# Patient Record
Sex: FEMALE | Race: WHITE | Employment: FULL TIME | URBAN - METROPOLITAN AREA
[De-identification: names, ages, dates, MRNs, and addresses within clinical notes are randomized per-mention and may not be internally consistent; named-entity substitution may affect disease eponyms.]

---

## 2020-02-03 ENCOUNTER — MEDICAL CORRESPONDENCE (OUTPATIENT)
Dept: HEALTH INFORMATION MANAGEMENT | Facility: CLINIC | Age: 65
End: 2020-02-03

## 2020-02-03 ENCOUNTER — TRANSFERRED RECORDS (OUTPATIENT)
Dept: HEALTH INFORMATION MANAGEMENT | Facility: CLINIC | Age: 65
End: 2020-02-03

## 2020-02-04 NOTE — TELEPHONE ENCOUNTER
FUTURE VISIT INFORMATION      FUTURE VISIT INFORMATION:    Date: 4/27/20    Time: 12:15pm    Location: [facility]  REFERRAL INFORMATION:    Referring provider:   Dr. Meyers     Referring providers clinic:  [facility]    Reason for visit/diagnosis  chronic canacolitis     RECORDS REQUESTED FROM:       Clinic name Comments Records Status Imaging Status   [facility] Request for recs sent 2/4- recs received and sent to scanning 2/5 [facility] OV/note 11/10/14-2/28/14 EPIC

## 2020-04-08 ENCOUNTER — DOCUMENTATION ONLY (OUTPATIENT)
Dept: CARE COORDINATION | Facility: CLINIC | Age: 65
End: 2020-04-08

## 2020-04-27 ENCOUNTER — PRE VISIT (OUTPATIENT)
Dept: OPHTHALMOLOGY | Facility: CLINIC | Age: 65
End: 2020-04-27

## 2021-06-04 ENCOUNTER — TRANSFERRED RECORDS (OUTPATIENT)
Dept: HEALTH INFORMATION MANAGEMENT | Facility: CLINIC | Age: 66
End: 2021-06-04

## 2021-06-04 ENCOUNTER — MEDICAL CORRESPONDENCE (OUTPATIENT)
Dept: HEALTH INFORMATION MANAGEMENT | Facility: CLINIC | Age: 66
End: 2021-06-04

## 2021-07-12 ENCOUNTER — OFFICE VISIT (OUTPATIENT)
Dept: OPHTHALMOLOGY | Facility: CLINIC | Age: 66
End: 2021-07-12
Payer: COMMERCIAL

## 2021-07-12 ENCOUNTER — PRE VISIT (OUTPATIENT)
Dept: OPHTHALMOLOGY | Facility: CLINIC | Age: 66
End: 2021-07-12

## 2021-07-12 ENCOUNTER — TELEPHONE (OUTPATIENT)
Dept: OPHTHALMOLOGY | Facility: CLINIC | Age: 66
End: 2021-07-12

## 2021-07-12 VITALS — BODY MASS INDEX: 19.63 KG/M2 | HEIGHT: 64 IN | WEIGHT: 115 LBS

## 2021-07-12 DIAGNOSIS — H04.421 CHRONIC LACRIMAL CANALICULITIS OF RIGHT LACRIMAL PASSAGE: Primary | ICD-10-CM

## 2021-07-12 PROCEDURE — 99204 OFFICE O/P NEW MOD 45 MIN: CPT | Mod: GC | Performed by: OPHTHALMOLOGY

## 2021-07-12 RX ORDER — MAGNESIUM 200 MG
200 TABLET ORAL
COMMUNITY

## 2021-07-12 RX ORDER — LISINOPRIL 20 MG/1
5 TABLET ORAL DAILY
COMMUNITY

## 2021-07-12 RX ORDER — OFLOXACIN 3 MG/ML
SOLUTION/ DROPS OPHTHALMIC
COMMUNITY
Start: 2021-06-04

## 2021-07-12 RX ORDER — DIPHENHYDRAMINE HCL 25 MG
CAPSULE ORAL
COMMUNITY

## 2021-07-12 ASSESSMENT — CONF VISUAL FIELD
OD_NORMAL: 1
OS_NORMAL: 1
METHOD: COUNTING FINGERS

## 2021-07-12 ASSESSMENT — VISUAL ACUITY
METHOD: SNELLEN - LINEAR
OD_CC: 20/25
CORRECTION_TYPE: GLASSES
OS_CC+: -1
OD_CC+: -2
OS_CC: 20/20
OD_PH_CC: 20/20
OD_PH_CC+: -3

## 2021-07-12 ASSESSMENT — MIFFLIN-ST. JEOR: SCORE: 1061.64

## 2021-07-12 ASSESSMENT — TONOMETRY
OD_IOP_MMHG: 12
IOP_METHOD: ICARE
OS_IOP_MMHG: 12

## 2021-07-12 ASSESSMENT — EXTERNAL EXAM - RIGHT EYE: OD_EXAM: NORMAL

## 2021-07-12 ASSESSMENT — EXTERNAL EXAM - LEFT EYE: OS_EXAM: NORMAL

## 2021-07-12 NOTE — TELEPHONE ENCOUNTER
Met with patient to schedule surgery with Dr. Rishi Butts.    Surgery was scheduled on 09/01 at [facility]  Patient will have H&P at [facility] Shanae Bautista (PCP)    Patient is aware a COVID-19 test is needed before their procedure. The test should be with-in 4 days of their procedure.   Test Details: Patient will schedule at local clinic: [facility]     Post-Op visit was scheduled on 09/13 Via Phone  Patient is aware a / is needed day of surgery.   Surgery packet was given, patient has my direct contact information for any further questions.

## 2021-07-12 NOTE — LETTER
2021         RE:  :  MRN: Camille Pitts  1955  [id number]     Dear Dr. Ger Doran,    Thank you for asking me to see your patient, Camille Pitts, for an oculoplastic   consultation.  My assessment and plan are below.  For further details, please see my attached clinic note.      Assessment & Plan     Camille Pitts is a 65 year old female with the following diagnoses:   1. 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      Sincerely,    Rishi Butts MD  Department of Ophthalmology and Visual Neurosciences  [facility]    CC: Shanae Bautista  [address] [address]  Via Fax: [phone]     Ger Doran, OD  [facility]  [address] [facility] [address]  Via Fax: [phone]

## 2021-07-12 NOTE — NURSING NOTE
Chief Complaints and History of Present Illnesses   Patient presents with     Consult For     Possible scope and irrigation      Chief Complaint(s) and History of Present Illness(es)     Consult For     Laterality: right eye    Associated symptoms: eye pain, itching, redness and tearing    Pain scale: 5/10    Comments: Possible scope and irrigation               Comments     Here by recommendation of Dr Ger Doran due to discharge right eye/ recurrent canaicluits. Experiencing inceasing dicharge form punta OD. Patient reports this has been ongoing issues for year. Did have medication in the past for Diviculitts that resolved this problem for about 3 years. Then this began again a year ago.   2 rounds of antibiotics last year helped temporarily. This year not responding to antibiotics. Hot packing in the morning with right eye. Matters and mcgowan during the day. Will get pain and pressure when matter gets really  Impacted with right eye and has to massage as much out as possible and use drops to treat.     States 2014 same issues with left eye that Dr Butts corrected.     No issues with vision related to this.         Just finished anitbiotics 2 week ago and did not help much.     Darling Mcmahan, COT COT 1:20 PM July 12, 2021

## 2021-07-12 NOTE — PROGRESS NOTES
Chief Complaints and History of Present Illnesses   Patient presents with     Consult For     Possible scope and irrigation      Chief Complaint(s) and History of Present Illness(es)     Consult For     In right eye.  Associated symptoms include eye pain, itching, redness and   tearing.  Pain was noted as 5/10. Additional comments: Possible scope and   irrigation               Comments     Here by recommendation of Dr Ger Doran due to discharge right eye/   recurrent canaicluits. Experiencing inceasing dicharge form punta OD.   Patient reports this has been ongoing issues for year. Did have medication   in the past for Diverticuliitis that resolved this problem for about 3 years.   Then this began again a year ago.   2 rounds of antibiotics last year helped temporarily. This year not   responding to antibiotics. Hot packing in the morning with right eye.   Matters and mcgowan during the day. Will get pain and pressure when matter   gets really  Impacted with right eye and has to massage as much out as   possible and use drops to treat.     States 2014 same issues with left eye that Dr Butts corrected.     No issues with vision related to this.         Just finished anitbiotics 2 week ago and did not help much.     Darling Mcmahan, COT COT 1:20 PM July 12, 2021             Pt here for concern of canaliculitis od. Has history of canaliculitis s/p left lower eyelid punctoplasty/canaliculotomy in 2014. Has not had any issues with the left eye since the surgery. Since [id number] she has noticed purulent drainage from the right eye and has been treated several times with PO antibiotics, but it has never fully cleared the infection. Had an episode of diverticuliitis which was treated with stronger abx ~ 5 years ago, which treated the eye drainage well and she didn't have recurrence for about 3 years. Now has most recently had purulent yellow drainage from the right eye for the past 3 weeks. Completed her recent course  of antibiotics 1.5-2 weeks ago.           Assessment & Plan     Camille Pitts is a 65 year old female with the following diagnoses:   1. 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  I personally obtained the chief complaint(s) and history of present illness.  I confirmed and edited as necessary the review of systems, past medical/surgical history, family history, social history, and examination findings as documented by others; and I examined the patient myself.  I personally reviewed the relevant tests, images, and reports as documented above.  I formulated and edited as necessary the assessment and plan and discussed the findings and management plan with the patient and family.   -Rishi Butts MD    Today with Camille Pitts, I reviewed the indications, risks, benefits, and alternatives of the proposed surgical procedure including, but not limited to, failure obtain the desired result  and need for additional surgery, bleeding, infection, loss of vision, loss of the eye, and the remote possibility of permanent damage to any organ system or death with the use of anesthesia.  I provided multiple opportunities for the questions, answered all questions to the best of my ability, and confirmed that my answers and my discussion were understood.     - Rishi Butts MD 2:17 PM 7/12/2021

## 2021-07-12 NOTE — PATIENT INSTRUCTIONS
TEAR SYSTEM  The tear film on the surface of the eye is a critical component of maintaining vision. Tears nourish and lubricate the surface of the eye as well as wash away debris. A smooth, balanced tear film (consisting of water, oil and mucus) also allows light to enter the eye in an optimal fashion. If there is a disturbance of the tear film, patients will often experience tearing, burning, irritation and most importantly blurred vision. Patients who experience tearing either have a problem with tear production or tear drainage.    Increased Tear Production and Dry Eyes  The eye has two sets of structures that produce tears. Smaller tear glands help maintain a baseline level of moisture on the surface of the eye. Unfortunately, inflammatory conditions like rheumatoid arthritis, Sjogrens disease as well as aging and menopause lead to decreased tear production. As tear production diminishes, the surface of the eye starts to dry out. Further, inflammation of the oil glands along the edge of the eyelid, common in patients with roseacea, also causes early breakdown and evaporation of the tear film. The brain senses the eye is both dry and irritated and in turn signals the main tear gland to flush the eye. As a result, the dry eye paradoxically tears and becomes watery. Patients with dry eyes note intermittent tearing of the eyes during activities like reading, driving, watching TV, using a computer or going outside on a windy day. These all cause the eye to dry out because the eye blinks less during these activities. The treatment for dry eyes includes 1) replacing tears with artificial lubricants which can be bought over the counter, 2) medications like Restasis that decrease inflammation in tear glands and encourages natural tear production to resume and finally 3) plugging of the tear drain. Other causes of increased tear production exist like allergies, infections and eyelashes poking the eye. These conditions can  often be found during examination.       What Are the Causes of Obstructed Tear Ducts?  An obstruction of the tear ducts may occur due to numerous reasons (aging, trauma, inflammatory conditions, medications and tumors) and cause numerous signs and symptoms ranging from wateriness or tearing to discharge, swelling, pain and infection. These signs and symptoms may result from the tear drainage system becoming obstructed at any point from the puncta to the nasal cavity.       What Are the Symptoms of Obstructed Tear Ducts?  If the tear passageways become blocked, tears cannot drain properly and may overflow from the eyelids onto the face as if you were crying. In addition to excessive tearing you may also experience blurred vision, mucous discharge, eye irritation, and painful swelling in the inner corner of the eyelids. A thorough examination by an ophthalmic plastic surgeon can determine the cause of tearing and recommended treatment.     How is an Obstructed Tear Duct Treated or Repaired?   Depending on your symptoms and their severity, your specialist will suggest an appropriate course. In mild cases, a treatment of warm compresses and antibiotics may be recommended. In more severe cases, surgical intervention to bypass the tear duct obstruction (dacryocystorhinostomy or DCR surgery) may be recommended. A DCR is performed by creating a new tear passageway from the lacrimal sac to the nose, bypassing the obstruction. This procedure may be perfomed through a small incision between the eye and nose (external DCR) or through the nose using a special lighted telescope (endoscopic DCR). A small silicone tube called a stent may temporarily be placed in the new passageway to keep it open during the healing process. In a small percentage of cases, the obstruction is between the puncta and the lacrimal sac. In these cases, in addition to the DCR procedure, the surgeon will insert a tiny artificial tear drain called a Cleary  Tube. A Cleary Tube is made of Pyrex glass and allows tears to drain directly from the eye to the lacrimal sac.       Where Is The Surgery Performed?  DCR surgery is usually performed as an outpatient procedure. Patients usually have some bruising and swelling on the side of the nose that subsides in one to two weeks. In general, surgery has a greater than 90% success rate and most patients experience a resolution of their tearing and discharge problems once surgery and recovery are completed.       Who Should Perform DCR Surgery?  When choosing a surgeon to perform a dacryocystorhinostomy or DCR, look for an ophthalmic plastic reconstructive and cosmetic surgeon who specializes in the eyelids, orbit, and tear drain system. It s also important that he or she is a member of the American Society of Ophthalmic Plastic and Reconstructive Surgery (ASOPRS). Dr. Penny's membership in the American Society of Ophthalmic Plastic and Reconstructive Surgery (ASOPRS) indicates he is not only a board certified ophthalmologist who knows the anatomy and structure of the eyelids and orbit, but also has had extensive training in ophthalmic plastic reconstructive and cosmetic surgery.

## 2021-07-17 DIAGNOSIS — Z11.59 ENCOUNTER FOR SCREENING FOR OTHER VIRAL DISEASES: ICD-10-CM

## 2021-08-31 ENCOUNTER — ANESTHESIA EVENT (OUTPATIENT)
Dept: SURGERY | Facility: AMBULATORY SURGERY CENTER | Age: 66
End: 2021-08-31
Payer: COMMERCIAL

## 2021-09-01 ENCOUNTER — HOSPITAL ENCOUNTER (OUTPATIENT)
Facility: AMBULATORY SURGERY CENTER | Age: 66
End: 2021-09-01
Attending: OPHTHALMOLOGY
Payer: COMMERCIAL

## 2021-09-01 ENCOUNTER — ANESTHESIA (OUTPATIENT)
Dept: SURGERY | Facility: AMBULATORY SURGERY CENTER | Age: 66
End: 2021-09-01
Payer: COMMERCIAL

## 2021-09-01 VITALS
OXYGEN SATURATION: 98 % | SYSTOLIC BLOOD PRESSURE: 112 MMHG | DIASTOLIC BLOOD PRESSURE: 60 MMHG | TEMPERATURE: 97.2 F | WEIGHT: 115 LBS | BODY MASS INDEX: 19.63 KG/M2 | HEIGHT: 64 IN | HEART RATE: 48 BPM | RESPIRATION RATE: 18 BRPM

## 2021-09-01 DIAGNOSIS — H04.421 CHRONIC LACRIMAL CANALICULITIS OF RIGHT LACRIMAL PASSAGE: ICD-10-CM

## 2021-09-01 PROCEDURE — 68530 CLEARANCE OF TEAR DUCT: CPT | Mod: RT

## 2021-09-01 PROCEDURE — 68530 CLEARANCE OF TEAR DUCT: CPT | Mod: RT | Performed by: OPHTHALMOLOGY

## 2021-09-01 RX ORDER — ONDANSETRON 4 MG/1
4 TABLET, ORALLY DISINTEGRATING ORAL EVERY 30 MIN PRN
Status: DISCONTINUED | OUTPATIENT
Start: 2021-09-01 | End: 2021-09-02 | Stop reason: HOSPADM

## 2021-09-01 RX ORDER — SODIUM CHLORIDE, SODIUM LACTATE, POTASSIUM CHLORIDE, CALCIUM CHLORIDE 600; 310; 30; 20 MG/100ML; MG/100ML; MG/100ML; MG/100ML
INJECTION, SOLUTION INTRAVENOUS CONTINUOUS
Status: DISCONTINUED | OUTPATIENT
Start: 2021-09-01 | End: 2021-09-02 | Stop reason: HOSPADM

## 2021-09-01 RX ORDER — LIDOCAINE 40 MG/G
CREAM TOPICAL
Status: DISCONTINUED | OUTPATIENT
Start: 2021-09-01 | End: 2021-09-01 | Stop reason: HOSPADM

## 2021-09-01 RX ORDER — FENTANYL CITRATE 50 UG/ML
INJECTION, SOLUTION INTRAMUSCULAR; INTRAVENOUS PRN
Status: DISCONTINUED | OUTPATIENT
Start: 2021-09-01 | End: 2021-09-01

## 2021-09-01 RX ORDER — ONDANSETRON 2 MG/ML
4 INJECTION INTRAMUSCULAR; INTRAVENOUS EVERY 30 MIN PRN
Status: DISCONTINUED | OUTPATIENT
Start: 2021-09-01 | End: 2021-09-02 | Stop reason: HOSPADM

## 2021-09-01 RX ORDER — LIDOCAINE HYDROCHLORIDE 20 MG/ML
INJECTION, SOLUTION INFILTRATION; PERINEURAL PRN
Status: DISCONTINUED | OUTPATIENT
Start: 2021-09-01 | End: 2021-09-01

## 2021-09-01 RX ORDER — ONDANSETRON 2 MG/ML
INJECTION INTRAMUSCULAR; INTRAVENOUS PRN
Status: DISCONTINUED | OUTPATIENT
Start: 2021-09-01 | End: 2021-09-01

## 2021-09-01 RX ORDER — MEPERIDINE HYDROCHLORIDE 25 MG/ML
12.5 INJECTION INTRAMUSCULAR; INTRAVENOUS; SUBCUTANEOUS
Status: DISCONTINUED | OUTPATIENT
Start: 2021-09-01 | End: 2021-09-02 | Stop reason: HOSPADM

## 2021-09-01 RX ORDER — PROPOFOL 10 MG/ML
INJECTION, EMULSION INTRAVENOUS PRN
Status: DISCONTINUED | OUTPATIENT
Start: 2021-09-01 | End: 2021-09-01

## 2021-09-01 RX ORDER — FENTANYL CITRATE 50 UG/ML
50 INJECTION, SOLUTION INTRAMUSCULAR; INTRAVENOUS EVERY 5 MIN PRN
Status: DISCONTINUED | OUTPATIENT
Start: 2021-09-01 | End: 2021-09-02 | Stop reason: HOSPADM

## 2021-09-01 RX ORDER — ERYTHROMYCIN 5 MG/G
OINTMENT OPHTHALMIC PRN
Status: DISCONTINUED | OUTPATIENT
Start: 2021-09-01 | End: 2021-09-01 | Stop reason: HOSPADM

## 2021-09-01 RX ORDER — TETRACAINE HYDROCHLORIDE 5 MG/ML
SOLUTION OPHTHALMIC PRN
Status: DISCONTINUED | OUTPATIENT
Start: 2021-09-01 | End: 2021-09-01 | Stop reason: HOSPADM

## 2021-09-01 RX ORDER — OXYCODONE HYDROCHLORIDE 5 MG/1
5 TABLET ORAL EVERY 4 HOURS PRN
Status: DISCONTINUED | OUTPATIENT
Start: 2021-09-01 | End: 2021-09-02 | Stop reason: HOSPADM

## 2021-09-01 RX ORDER — LIDOCAINE HYDROCHLORIDE AND EPINEPHRINE 10; 10 MG/ML; UG/ML
INJECTION, SOLUTION INFILTRATION; PERINEURAL PRN
Status: DISCONTINUED | OUTPATIENT
Start: 2021-09-01 | End: 2021-09-01 | Stop reason: HOSPADM

## 2021-09-01 RX ORDER — ACETAMINOPHEN 325 MG/1
975 TABLET ORAL ONCE
Status: COMPLETED | OUTPATIENT
Start: 2021-09-01 | End: 2021-09-01

## 2021-09-01 RX ORDER — SODIUM CHLORIDE, SODIUM LACTATE, POTASSIUM CHLORIDE, CALCIUM CHLORIDE 600; 310; 30; 20 MG/100ML; MG/100ML; MG/100ML; MG/100ML
INJECTION, SOLUTION INTRAVENOUS CONTINUOUS
Status: DISCONTINUED | OUTPATIENT
Start: 2021-09-01 | End: 2021-09-01 | Stop reason: HOSPADM

## 2021-09-01 RX ADMIN — PROPOFOL 20 MG: 10 INJECTION, EMULSION INTRAVENOUS at 07:46

## 2021-09-01 RX ADMIN — SODIUM CHLORIDE, SODIUM LACTATE, POTASSIUM CHLORIDE, CALCIUM CHLORIDE: 600; 310; 30; 20 INJECTION, SOLUTION INTRAVENOUS at 07:39

## 2021-09-01 RX ADMIN — PROPOFOL 30 MG: 10 INJECTION, EMULSION INTRAVENOUS at 07:42

## 2021-09-01 RX ADMIN — ACETAMINOPHEN 975 MG: 325 TABLET ORAL at 06:52

## 2021-09-01 RX ADMIN — ONDANSETRON 4 MG: 2 INJECTION INTRAMUSCULAR; INTRAVENOUS at 07:48

## 2021-09-01 RX ADMIN — LIDOCAINE HYDROCHLORIDE 100 MG: 20 INJECTION, SOLUTION INFILTRATION; PERINEURAL at 07:42

## 2021-09-01 RX ADMIN — FENTANYL CITRATE 50 MCG: 50 INJECTION, SOLUTION INTRAMUSCULAR; INTRAVENOUS at 07:42

## 2021-09-01 ASSESSMENT — MIFFLIN-ST. JEOR: SCORE: 1061.64

## 2021-09-01 NOTE — BRIEF OP NOTE
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  Implants: * No implants in log *

## 2021-09-01 NOTE — ANESTHESIA CARE TRANSFER NOTE
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    Anesthesia Type:   No value filed.     Note:    Oropharynx: oropharynx clear of all foreign objects  Level of Consciousness: awake  Oxygen Supplementation: room air    Independent Airway: airway patency satisfactory and stable  Dentition: dentition unchanged  Vital Signs Stable: post-procedure vital signs reviewed and stable  Report to RN Given: handoff report given  Patient transferred to: Phase II    Handoff Report: Identifed the Patient, Identified the Reponsible Provider, Reviewed the pertinent medical history, Discussed the surgical course, Reviewed Intra-OP anesthesia mangement and issues during anesthesia, Set expectations for post-procedure period and Allowed opportunity for questions and acknowledgement of understanding      Vitals:  Vitals Value Taken Time   /56 09/01/21 0806   Temp 35.8  C (96.5  F) 09/01/21 0806   Pulse 54 09/01/21 0806   Resp 16 09/01/21 0806   SpO2 98 % 09/01/21 0806       Electronically Signed By: ESPINOZA Robles CRNA  September 1, 2021  8:07 AM

## 2021-09-01 NOTE — OR NURSING
Attending Dr. DEVON Butts with surgery text paged the following message:    Please place pre-op orders for Camille Pitts.  Thank you.  Alicja MCINTOSH [phone]

## 2021-09-01 NOTE — DISCHARGE INSTRUCTIONS
Post-operative Instructions    Ophthalmic Plastic and Reconstructive Surgery  Rishi Butts M.D.  Kaleigh Hooper M.D.    All instructions apply to the operated eye(s) or eyelid(s)    What to expect after surgery:    There will be some swelling, bruising, and likely a black eye (even into the lower eyelids and cheeks). Also expect crusting and discharge from the eye and/or incisions.     A small amount of surface bleeding is normal for the first 48 hours after surgery.    You may notice some bloody tears for the first few days after surgery. This is normal.    Your eye(s) and eyelid(s) may be painful and tender. This is normal after surgery. Use the pain medication as prescribed. If your pain does not improve despite the medication, contact the office.    Wound care and personal care:    If a patch or bandage has been placed, please leave this in place until seen in clinic. Prevent the bandage from getting wet.     Apply ice compresses 15 minutes on 15 minutes off while awake for the first 2 days after surgery, then switch to warm compresses 4 times a day until seen by your physician.     For warm packs you can place a cup of dry uncooked rice in a clean cotton sock. Place sock in microwave 30 seconds to one minute. Next place the warm sock into a plastic bag and wrap the bag with clean warm wet washcloth and place over operated eye.      You may shower or wash your hair the day after surgery. Do not bathe or go swimming for 1 week to prevent contamination of your wounds.    Do not apply make-up to the eyes or eyelids for 2 weeks after surgery.    Activity restrictions and driving:    Avoid heavy lifting, bending, exercise or strenuous activity for 1 week after surgery.    You may resume other activities and return to work as tolerated.    You may not resume driving until have you stopped using narcotic pain medications(such as Norco, Percocet, Tylenol #3).    Medications:    Restart all your regular home  medications and eye drops today. If you take Plavix or Aspirin on a regular basis, wait for 3 days after your surgery before restarting these in order to decrease the risk of bleeding complications.    Avoid aspirin and aspirin-like medications (Motrin, Aleve, Ibuprofen, [place] etc) for 5 days to reduce the risk of bleeding. You may take Tylenol (acetaminophen) for pain.    In addition to your home medications, take the following post-operative medications as prescribed by your physician:    Apply antibiotic ointment (erythromycin) to all sutures three times a day, and into the operated eye(s) at night.   Take scheduled extra strength Tylenol for pain.      Contact information and follow-up:    Return to the Eye Clinic for a follow-up appointment with your physician as  scheduled. If no appointment has been scheduled, call [phone] for an  appointment with Dr. Butts within 1 to 2 weeks from your date of surgery.  -     Please email a few photos of your eye(s) or other operative site(s) to [email or web address] prior to your follow up visit.      For severe pain, bleeding, or loss of vision, call the Eye Clinic at [phone].    After hours or on weekends and holidays, call [phone] and ask to speak with the ophthalmologist on call.      [facility]  Home Care Following Anesthesia  For 24 hours after surgery:  1. Get plenty of rest.  A responsible adult must stay with you for at least 24 hours after you leave the surgery center.  2. Do not drive or use heavy equipment.  If you have weakness or tingling, don't drive or use heavy equipment until this feeling goes away.   3. Do not drink alcohol.   4. Avoid strenuous or risky activities.  Ask for help when climbing stairs.  5. You may feel lightheaded.  IF so, sit for a few minutes before standing.  Have someone help you get up.   6. If you have nausea (feel sick to your stomach): Drink only clear liquids such as  apple juice, ginger ale, broth or 7-Up.  Rest may also help.  Be sure to drink enough fluids.  Move to a regular diet as you feel able.   7. You may have a slight fever.  Call the doctor if your fever is over 100 F (37.7 C) (taken under the tongue) or lasts longer than 24 hours.  8. You may have a dry mouth, a sore throat, muscle aches or trouble sleeping. These should go away after 24 hours.  9. Do not make important or legal decisions.   10. It is recommended to avoid smoking.               Tips for taking pain medications  To get the best pain relief possible, remember these points:    Take pain medications as directed, before pain becomes severe.    Pain medication can upset your stomach: taking it with food may help.    Constipation is a common side effect of pain medication. Drink plenty of  fluids.    Eat foods high in fiber. Take a stool softener if recommended by your doctor or pharmacist.    Do not drink alcohol, drive or operate machinery while taking pain medications.    Ask about other ways to control pain, such as with heat, ice or relaxation.    Tylenol/Acetaminophen Consumption  To help encourage the safe use of acetaminophen, the makers of TYLENOL  have lowered the maximum daily dose for single-ingredient Extra Strength TYLENOL  (acetaminophen) products sold in the [place] from 8 pills per day (4,000 mg) to 6 pills per day (3,000 mg). The dosing interval has also changed from 2 pills every 4-6 hours to 2 pills every 6 hours.    If you feel your pain relief is insufficient, you may take Tylenol/Acetaminophen in addition to your narcotic pain medication.     Be careful not to exceed 3,000 mg of Tylenol/Acetaminophen in a 24 hour period from all sources.    If you are taking extra strength Tylenol/acetaminophen (500 mg), the maximum dose is 6 tablets in 24 hours.    If you are taking regular strength acetaminophen (325 mg), the maximum dose is 9 tablets in 24 hours.    975 mg of Tylenol given at 6:50 AM.  Okay to take next dose at 2:50 PM, then follow instructions on the bottle.     Call a doctor for any of the followin. Signs of infection (fever, growing tenderness at the surgery site, a large amount of drainage or bleeding, severe pain, foul-smelling drainage, redness, swelling).  2. It has been over 8 to 10 hours since surgery and you are still not able to urinate (pass water).  3. Headache for over 24 hours.  4. Signs of Covid-19 infection (temperature over 100 degrees, shortness of breath, cough, loss of taste/smell, generalized body aches, persistent headache, chills, sore throat, nausea/vomiting/diarrhea)    Your doctor is:  Dr. Rishi Butts, Ophthalmology [phone]             Or dial [phone] and ask for the resident on call for:  Ophthalmology  For emergency care, call the:  [facility]:  [phone] (TTY for hearing impaired: [phone])

## 2021-09-01 NOTE — ANESTHESIA POSTPROCEDURE EVALUATION
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    Anesthesia Type:  MAC    Note:  Disposition: Outpatient   Postop Pain Control: Uneventful            Sign Out: Well controlled pain   PONV: No   Neuro/Psych: Uneventful            Sign Out: Acceptable/Baseline neuro status   Airway/Respiratory: Uneventful            Sign Out: Acceptable/Baseline resp. status   CV/Hemodynamics: Uneventful            Sign Out: Acceptable CV status; No obvious hypovolemia; No obvious fluid overload   Other NRE: NONE   DID A NON-ROUTINE EVENT OCCUR? No           Last vitals:  Vitals Value Taken Time   /60 09/01/21 0845   Temp 36.2  C (97.2  F) 09/01/21 0830   Pulse 48 09/01/21 0845   Resp 18 09/01/21 0845   SpO2 98 % 09/01/21 0845       Electronically Signed By: Austin Li MD  September 1, 2021  9:19 AM

## 2021-09-01 NOTE — ANESTHESIA PREPROCEDURE EVALUATION
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 Carvajal     GYN SURGERY      vag hyst w/right oophorectomy,left oophorectomy     HERNIA REPAIR      right     PROBE LACRIMAL DUCT  2014    Procedure: PROBE LACRIMAL DUCT;  LEFT LOWER LID CANALICULOTOMY WITH REMOVAL OF FOREIGN BODY;  Surgeon: Rishi Butts MD;  Location:  [facility]     PUNCTAL CLOSURE, PLUGS      lower left eyelid      Allergies   Allergen Reactions     Cephalexin Hives     Shellfish-Derived Products Anaphylaxis     Throat itching and Lip swelling  Throat itching and Lip swelling     Adhesive Tape Blisters     Ampicillin GI Disturbance and Other (See Comments)     Stomatitis     Liquid Adhesive      Other reaction(s): Blisters     Latex Rash      Social History     Tobacco Use     Smoking status: Former Smoker     Quit date: 1981     Years since quittin.6     Smokeless tobacco: Never Used   Substance Use Topics     Alcohol use: Yes     Comment: 2X/WEEK      Wt Readings from Last 1 Encounters:   21 52.2 kg (115 lb)        Anesthesia Evaluation            ROS/MED HX  ENT/Pulmonary:  - neg pulmonary ROS     Neurologic:  - neg neurologic ROS     Cardiovascular:  - neg cardiovascular ROS     METS/Exercise Tolerance:     Hematologic:  -  neg hematologic  ROS     Musculoskeletal: Comment: RA      GI/Hepatic:  - neg GI/hepatic ROS     Renal/Genitourinary:  - neg Renal ROS     Endo:  - neg endo ROS     Psychiatric/Substance Use:     (+) psychiatric history depression     Infectious Disease:  - neg infectious disease ROS     Malignancy:  - neg malignancy ROS     Other: Comment: Chronic lacrimal caniculitis           Physical Exam    Airway  airway exam normal           Respiratory Devices and Support         Dental  no notable dental history         Cardiovascular   cardiovascular exam normal          Pulmonary   pulmonary exam normal                OUTSIDE LABS:  CBC: No results found for: WBC, HGB, HCT, PLT  BMP: No results found for: NA, POTASSIUM, CHLORIDE, CO2, BUN, CR, GLC  COAGS: No results found for: PTT, INR, FIBR  POC: No results found for: BGM, HCG, HCGS  HEPATIC: No results found for: ALBUMIN, PROTTOTAL, ALT, AST, GGT, ALKPHOS, BILITOTAL, BILIDIRECT, LILLY  OTHER: No results found for: PH, LACT, A1C, MAREK, PHOS, MAG, LIPASE, AMYLASE, TSH, T4, T3, CRP, SED    Anesthesia Plan    ASA Status:  2   NPO Status:  NPO Appropriate    Anesthesia Type: MAC.     - Reason for MAC: straight local not clinically adequate   Induction: Intravenous.   Maintenance: TIVA.        Consents    Anesthesia Plan(s) and associated risks, benefits, and realistic alternatives discussed. Questions answered and patient/representative(s) expressed understanding.     - Discussed with:  Patient      - Extended Intubation/Ventilatory Support Discussed: No.      - Patient is DNR/DNI Status: No    Use of blood products discussed: No .     Postoperative Care    Pain management: Multi-modal analgesia.   PONV prophylaxis: Ondansetron (or other 5HT-3)     Comments:         H&P reviewed: Unable to attach H&P to encounter due to EHR limitations. H&P Update: appropriate H&P reviewed, patient examined. No interval changes since H&P (within 30 days).         Austin Li MD

## 2021-09-01 NOTE — OP NOTE
PREOPERATIVE DIAGNOSIS: Right  lower eyelid canaliculitis.       POSTOPERATIVE DIAGNOSIS:  Right lower eyelid canaliculitis.       PROCEDURE PERFORMED:  Right lower lid snip punctoplasty and removal of foreign body from the lacrimal passages.       ANESTHESIA:  Monitored with local infiltration of 1% lidocaine with epinephrine.       COMPLICATIONS:  None.       ESTIMATED BLOOD LOSS:  Less than 5 mL.       SPECIMENS:  None.         SURGEON:  Raphael Partida MD       ASSISTANT: Neda Ivory MD       HISTORY AND INDICATIONS: Camille Pitts   presented with a right lower canalicular swelling that clinically appeared to be canaliculitis.  After the risks, benefits and alternatives to the proposed procedure were explained, informed consent was obtained.       DESCRIPTION OF PROCEDURE:  The patient was brought to the operating room and placed supine on the operating table.  IV sedation was given.  The right lower lid was infiltrated with local anesthetic.  The area was prepped and draped in the typical sterile fashion for oculoplastic surgery.  Attention was directed to the right side. The lower punctum was dilated and a  snip punctoplasty performed with Damaris scissors.  Using pressure and a chalazion curet, multiple dacryoliths were removed from the lacrimal passageways. Once the tissue was completely clear, we irrigated with erythromycin ophthalmic ointment into the lid.   The lid was then reverted to its normal position and the patient taken to recovery room in stable condition.           RAPHAEL PARTIDA MD

## 2021-09-03 ENCOUNTER — DOCUMENTATION ONLY (OUTPATIENT)
Dept: OPHTHALMOLOGY | Facility: CLINIC | Age: 66
End: 2021-09-03

## 2021-09-03 NOTE — PROGRESS NOTES
Telephone call to Camille Pitts    Doing well with no pain, good vision, and no bleeding. All questions were answered, she is doing well, and postoperative care was reviewed.  A postop appointment has been scheduled.    Kaleigh Hooper MD

## 2021-09-13 ENCOUNTER — VIRTUAL VISIT (OUTPATIENT)
Dept: OPHTHALMOLOGY | Facility: CLINIC | Age: 66
End: 2021-09-13
Payer: COMMERCIAL

## 2021-09-13 DIAGNOSIS — Z98.890 POSTOPERATIVE EYE STATE: Primary | ICD-10-CM

## 2021-09-13 PROCEDURE — 99207 PR NO BILLABLE SERVICE THIS VISIT: CPT | Performed by: OPHTHALMOLOGY

## 2021-09-13 NOTE — PROGRESS NOTES
A telephone call was made to Camille Pitts to make sure the post operative course has been uneventful and that all questions were answered. There was no answer and a telephone message was left.    Rishi Butts MD

## (undated) DEVICE — GLOVE PROTEXIS MICRO 7.5  2D73PM75

## (undated) DEVICE — SPONGE COTTONOID 2X1/2" 80-1406

## (undated) DEVICE — SOL WATER IRRIG 500ML BOTTLE 2F7113

## (undated) DEVICE — LINEN TOWEL PACK X5 5464

## (undated) DEVICE — EYE PREP BETADINE 5% SOLUTION 30ML 0065-0411-30

## (undated) DEVICE — PACK MINOR EYE CUSTOM ASC

## (undated) RX ORDER — LIDOCAINE HYDROCHLORIDE 20 MG/ML
INJECTION, SOLUTION EPIDURAL; INFILTRATION; INTRACAUDAL; PERINEURAL
Status: DISPENSED
Start: 2021-09-01

## (undated) RX ORDER — ONDANSETRON 2 MG/ML
INJECTION INTRAMUSCULAR; INTRAVENOUS
Status: DISPENSED
Start: 2021-09-01

## (undated) RX ORDER — ACETAMINOPHEN 325 MG/1
TABLET ORAL
Status: DISPENSED
Start: 2021-09-01

## (undated) RX ORDER — PROPOFOL 10 MG/ML
INJECTION, EMULSION INTRAVENOUS
Status: DISPENSED
Start: 2021-09-01

## (undated) RX ORDER — LIDOCAINE HYDROCHLORIDE 10 MG/ML
INJECTION, SOLUTION EPIDURAL; INFILTRATION; INTRACAUDAL; PERINEURAL
Status: DISPENSED
Start: 2021-09-01

## (undated) RX ORDER — FENTANYL CITRATE 50 UG/ML
INJECTION, SOLUTION INTRAMUSCULAR; INTRAVENOUS
Status: DISPENSED
Start: 2021-09-01